# Patient Record
Sex: MALE | Race: WHITE | NOT HISPANIC OR LATINO | Employment: OTHER | URBAN - METROPOLITAN AREA
[De-identification: names, ages, dates, MRNs, and addresses within clinical notes are randomized per-mention and may not be internally consistent; named-entity substitution may affect disease eponyms.]

---

## 2019-02-05 ENCOUNTER — NEW PATIENT COMPREHENSIVE (OUTPATIENT)
Dept: URBAN - METROPOLITAN AREA CLINIC 43 | Facility: CLINIC | Age: 74
End: 2019-02-05

## 2019-02-05 DIAGNOSIS — I69.898: ICD-10-CM

## 2019-02-05 DIAGNOSIS — H53.461: ICD-10-CM

## 2019-02-05 PROCEDURE — 92015 DETERMINE REFRACTIVE STATE: CPT

## 2019-02-05 PROCEDURE — 92004 COMPRE OPH EXAM NEW PT 1/>: CPT

## 2019-02-05 ASSESSMENT — VISUAL ACUITY
OS_BAT: <20/400
OS_SC: J14
OD_SC: 20/60-1
OD_SC: J14
OS_SC: 20/80-2
OD_BAT: <20/400
OD_CC: J10-
OS_CC: J10-

## 2019-02-05 ASSESSMENT — TONOMETRY
OS_IOP_MMHG: 16
OD_IOP_MMHG: 16

## 2019-12-10 NOTE — PATIENT DISCUSSION
Explained condition and handout given. Recommended regular use of ATs and lid hygiene routine with hot compresses.

## 2019-12-10 NOTE — PATIENT DISCUSSION
Explained REMI and recommended regular use of ATs 3-4 times per day and prn. Okay to use Gel ATs qhs. Discussed option of trying Xiidra bid OU. Sample and Ask Iirs info given. Julieta Cleveland does not work for everyone and takes about 2 weeks of use before Pt will notice if provides improvement. Okay to E-Rx if Pt calls.

## 2019-12-10 NOTE — PATIENT DISCUSSION
Advised pt of condition of cataracts and resulting changes in vision. Updated GLRx given today. Discussed symptoms of worsening and becoming more bothersome.

## 2021-02-25 NOTE — PATIENT DISCUSSION
Explained REMI and recommended regular use of ATs 3-4 times per day and prn and Gel ATs qhs.  Pt tried Delmis Solo without improvement.

## 2021-10-06 NOTE — PATIENT DISCUSSION
Explained REMI and recommended regular use of ATs 3-4 times per day and prn and Gel ATs qhs.  Pt tried Yehuda Arellano without improvement.